# Patient Record
Sex: MALE | Race: NATIVE HAWAIIAN OR OTHER PACIFIC ISLANDER
[De-identification: names, ages, dates, MRNs, and addresses within clinical notes are randomized per-mention and may not be internally consistent; named-entity substitution may affect disease eponyms.]

---
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
## 2018-03-18 ENCOUNTER — HOSPITAL ENCOUNTER (EMERGENCY)
Dept: HOSPITAL 67 - ED | Age: 18
Discharge: HOME | End: 2018-03-18
Payer: COMMERCIAL

## 2018-03-18 VITALS — BODY MASS INDEX: 37.3 KG/M2 | WEIGHT: 300.01 LBS | HEIGHT: 75 IN

## 2018-03-18 VITALS — DIASTOLIC BLOOD PRESSURE: 89 MMHG | SYSTOLIC BLOOD PRESSURE: 136 MMHG

## 2018-03-18 VITALS — TEMPERATURE: 97.3 F

## 2018-03-18 DIAGNOSIS — Z13.89: Primary | ICD-10-CM

## 2018-03-18 LAB
APTT BLD: 30.6 SECONDS (ref 24.5–33.6)
PLATELET # BLD: 246 K/UL (ref 142–355)
POTASSIUM SERPL-SCNC: 3.4 MMOL/L (ref 3.6–5.2)
SODIUM SERPL-SCNC: 133 MMOL/L (ref 136–145)

## 2018-03-18 PROCEDURE — 96365 THER/PROPH/DIAG IV INF INIT: CPT

## 2018-03-18 PROCEDURE — 96372 THER/PROPH/DIAG INJ SC/IM: CPT

## 2018-03-18 PROCEDURE — 80053 COMPREHEN METABOLIC PANEL: CPT

## 2018-03-18 PROCEDURE — 74022 RADEX COMPL AQT ABD SERIES: CPT

## 2018-03-18 PROCEDURE — 85610 PROTHROMBIN TIME: CPT

## 2018-03-18 PROCEDURE — 85027 COMPLETE CBC AUTOMATED: CPT

## 2018-03-18 PROCEDURE — 85730 THROMBOPLASTIN TIME PARTIAL: CPT

## 2018-03-18 PROCEDURE — 81000 URINALYSIS NONAUTO W/SCOPE: CPT

## 2018-03-18 PROCEDURE — 99284 EMERGENCY DEPT VISIT MOD MDM: CPT

## 2018-03-18 PROCEDURE — 36415 COLL VENOUS BLD VENIPUNCTURE: CPT

## 2019-07-15 ENCOUNTER — HOSPITAL ENCOUNTER (EMERGENCY)
Dept: HOSPITAL 67 - ED | Age: 19
Discharge: HOME | End: 2019-07-15
Payer: COMMERCIAL

## 2019-07-15 VITALS — HEIGHT: 72 IN | BODY MASS INDEX: 41.99 KG/M2 | WEIGHT: 310.01 LBS

## 2019-07-15 VITALS — SYSTOLIC BLOOD PRESSURE: 119 MMHG | TEMPERATURE: 100 F | DIASTOLIC BLOOD PRESSURE: 75 MMHG

## 2019-07-15 DIAGNOSIS — R55: Primary | ICD-10-CM
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
## 2019-07-15 DIAGNOSIS — E86.0: ICD-10-CM
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
## 2019-07-15 DIAGNOSIS — S09.8XXA: ICD-10-CM
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
## 2019-07-15 LAB
APTT BLD: 26.3 SECONDS (ref 24.5–33.6)
PLATELET # BLD: 233 K/UL (ref 142–355)
POTASSIUM SERPL-SCNC: 4.4 MMOL/L (ref 3.6–5.2)
SODIUM SERPL-SCNC: 139 MMOL/L (ref 136–145)

## 2019-07-15 PROCEDURE — 82550 ASSAY OF CK (CPK): CPT

## 2019-07-15 PROCEDURE — 80307 DRUG TEST PRSMV CHEM ANLYZR: CPT

## 2019-07-15 PROCEDURE — 96360 HYDRATION IV INFUSION INIT: CPT

## 2019-07-15 PROCEDURE — 81000 URINALYSIS NONAUTO W/SCOPE: CPT

## 2019-07-15 PROCEDURE — 36415 COLL VENOUS BLD VENIPUNCTURE: CPT

## 2019-07-15 PROCEDURE — 84443 ASSAY THYROID STIM HORMONE: CPT

## 2019-07-15 PROCEDURE — 85027 COMPLETE CBC AUTOMATED: CPT

## 2019-07-15 PROCEDURE — 85730 THROMBOPLASTIN TIME PARTIAL: CPT

## 2019-07-15 PROCEDURE — 96361 HYDRATE IV INFUSION ADD-ON: CPT

## 2019-07-15 PROCEDURE — 83735 ASSAY OF MAGNESIUM: CPT

## 2019-07-15 PROCEDURE — 82962 GLUCOSE BLOOD TEST: CPT

## 2019-07-15 PROCEDURE — 85610 PROTHROMBIN TIME: CPT

## 2019-07-15 PROCEDURE — 80053 COMPREHEN METABOLIC PANEL: CPT

## 2019-07-15 PROCEDURE — 84484 ASSAY OF TROPONIN QUANT: CPT

## 2019-07-15 PROCEDURE — 99284 EMERGENCY DEPT VISIT MOD MDM: CPT

## 2019-07-15 PROCEDURE — 93005 ELECTROCARDIOGRAM TRACING: CPT

## 2019-07-27 ENCOUNTER — HOSPITAL ENCOUNTER (EMERGENCY)
Dept: HOSPITAL 67 - ED | Age: 19
Discharge: HOME | End: 2019-07-27
Payer: COMMERCIAL

## 2019-07-27 VITALS — DIASTOLIC BLOOD PRESSURE: 63 MMHG | TEMPERATURE: 98.3 F | SYSTOLIC BLOOD PRESSURE: 119 MMHG

## 2019-07-27 VITALS — BODY MASS INDEX: 38.54 KG/M2 | WEIGHT: 310 LBS | HEIGHT: 75 IN

## 2019-07-27 DIAGNOSIS — L50.9: Primary | ICD-10-CM

## 2019-07-27 PROCEDURE — 99282 EMERGENCY DEPT VISIT SF MDM: CPT

## 2019-07-27 PROCEDURE — 99283 EMERGENCY DEPT VISIT LOW MDM: CPT

## 2019-07-27 PROCEDURE — 96372 THER/PROPH/DIAG INJ SC/IM: CPT

## 2019-08-24 ENCOUNTER — HOSPITAL ENCOUNTER (EMERGENCY)
Dept: HOSPITAL 67 - ED | Age: 19
Discharge: HOME | End: 2019-08-24
Payer: COMMERCIAL

## 2019-08-24 VITALS — BODY MASS INDEX: 39.17 KG/M2 | HEIGHT: 75 IN | WEIGHT: 315 LBS

## 2019-08-24 VITALS — SYSTOLIC BLOOD PRESSURE: 117 MMHG | TEMPERATURE: 98.1 F | DIASTOLIC BLOOD PRESSURE: 76 MMHG

## 2019-08-24 DIAGNOSIS — E86.0: ICD-10-CM

## 2019-08-24 DIAGNOSIS — K52.89: Primary | ICD-10-CM

## 2019-08-24 LAB
PLATELET # BLD: 229 K/UL (ref 142–355)
POTASSIUM SERPL-SCNC: 3.6 MMOL/L (ref 3.6–5.2)
SODIUM SERPL-SCNC: 144 MMOL/L (ref 136–145)

## 2019-08-24 PROCEDURE — 85027 COMPLETE CBC AUTOMATED: CPT

## 2019-08-24 PROCEDURE — 96374 THER/PROPH/DIAG INJ IV PUSH: CPT

## 2019-08-24 PROCEDURE — 96375 TX/PRO/DX INJ NEW DRUG ADDON: CPT

## 2019-08-24 PROCEDURE — 99284 EMERGENCY DEPT VISIT MOD MDM: CPT

## 2019-08-24 PROCEDURE — 96360 HYDRATION IV INFUSION INIT: CPT

## 2019-08-24 PROCEDURE — 80053 COMPREHEN METABOLIC PANEL: CPT

## 2019-08-24 PROCEDURE — 96365 THER/PROPH/DIAG IV INF INIT: CPT

## 2019-08-24 PROCEDURE — 36415 COLL VENOUS BLD VENIPUNCTURE: CPT

## 2019-08-27 ENCOUNTER — HOSPITAL ENCOUNTER (EMERGENCY)
Dept: HOSPITAL 67 - ED | Age: 19
Discharge: HOME | End: 2019-08-27
Payer: COMMERCIAL

## 2019-08-27 VITALS — TEMPERATURE: 98.9 F | SYSTOLIC BLOOD PRESSURE: 125 MMHG | DIASTOLIC BLOOD PRESSURE: 74 MMHG

## 2019-08-27 VITALS — HEIGHT: 75 IN | BODY MASS INDEX: 38.17 KG/M2 | WEIGHT: 307 LBS

## 2019-08-27 DIAGNOSIS — H60.8X1: Primary | ICD-10-CM

## 2019-08-27 PROCEDURE — 96372 THER/PROPH/DIAG INJ SC/IM: CPT

## 2019-08-27 PROCEDURE — 99283 EMERGENCY DEPT VISIT LOW MDM: CPT

## 2020-05-03 ENCOUNTER — HOSPITAL ENCOUNTER (OUTPATIENT)
Dept: HOSPITAL 67 - ED | Age: 20
Setting detail: OBSERVATION
LOS: 1 days | Discharge: HOME | End: 2020-05-04
Attending: INTERNAL MEDICINE | Admitting: INTERNAL MEDICINE
Payer: COMMERCIAL

## 2020-05-03 VITALS — WEIGHT: 310 LBS | HEIGHT: 75 IN | BODY MASS INDEX: 38.54 KG/M2 | BODY MASS INDEX: 38.54 KG/M2

## 2020-05-03 VITALS — TEMPERATURE: 101.1 F | SYSTOLIC BLOOD PRESSURE: 133 MMHG | DIASTOLIC BLOOD PRESSURE: 78 MMHG

## 2020-05-03 DIAGNOSIS — R50.9: ICD-10-CM

## 2020-05-03 DIAGNOSIS — Z03.818: ICD-10-CM

## 2020-05-03 DIAGNOSIS — I10: ICD-10-CM

## 2020-05-03 DIAGNOSIS — R73.9: ICD-10-CM

## 2020-05-03 DIAGNOSIS — J06.9: ICD-10-CM

## 2020-05-03 DIAGNOSIS — R00.2: ICD-10-CM

## 2020-05-03 DIAGNOSIS — R09.81: ICD-10-CM

## 2020-05-03 DIAGNOSIS — R55: Primary | ICD-10-CM

## 2020-05-03 LAB
PLATELET # BLD: 207 K/UL (ref 142–355)
POTASSIUM SERPL-SCNC: 3.8 MMOL/L (ref 3.6–5.2)
SODIUM SERPL-SCNC: 137 MMOL/L (ref 136–145)

## 2020-05-03 PROCEDURE — 85730 THROMBOPLASTIN TIME PARTIAL: CPT

## 2020-05-03 PROCEDURE — 96372 THER/PROPH/DIAG INJ SC/IM: CPT

## 2020-05-03 PROCEDURE — 87502 INFLUENZA DNA AMP PROBE: CPT

## 2020-05-03 PROCEDURE — 82550 ASSAY OF CK (CPK): CPT

## 2020-05-03 PROCEDURE — 85379 FIBRIN DEGRADATION QUANT: CPT

## 2020-05-03 PROCEDURE — 83036 HEMOGLOBIN GLYCOSYLATED A1C: CPT

## 2020-05-03 PROCEDURE — 85027 COMPLETE CBC AUTOMATED: CPT

## 2020-05-03 PROCEDURE — U0002 COVID-19 LAB TEST NON-CDC: HCPCS

## 2020-05-03 PROCEDURE — 87651 STREP A DNA AMP PROBE: CPT

## 2020-05-03 PROCEDURE — 99284 EMERGENCY DEPT VISIT MOD MDM: CPT

## 2020-05-03 PROCEDURE — 83880 ASSAY OF NATRIURETIC PEPTIDE: CPT

## 2020-05-03 PROCEDURE — G0378 HOSPITAL OBSERVATION PER HR: HCPCS

## 2020-05-03 PROCEDURE — 99220: CPT

## 2020-05-03 PROCEDURE — 96366 THER/PROPH/DIAG IV INF ADDON: CPT

## 2020-05-03 PROCEDURE — 85610 PROTHROMBIN TIME: CPT

## 2020-05-03 PROCEDURE — 93005 ELECTROCARDIOGRAM TRACING: CPT

## 2020-05-03 PROCEDURE — 96376 TX/PRO/DX INJ SAME DRUG ADON: CPT

## 2020-05-03 PROCEDURE — 96360 HYDRATION IV INFUSION INIT: CPT

## 2020-05-03 PROCEDURE — 84484 ASSAY OF TROPONIN QUANT: CPT

## 2020-05-03 PROCEDURE — 80053 COMPREHEN METABOLIC PANEL: CPT

## 2020-05-03 PROCEDURE — 36415 COLL VENOUS BLD VENIPUNCTURE: CPT

## 2020-05-03 PROCEDURE — 81000 URINALYSIS NONAUTO W/SCOPE: CPT

## 2020-05-03 PROCEDURE — 94664 DEMO&/EVAL PT USE INHALER: CPT

## 2020-05-03 PROCEDURE — 96365 THER/PROPH/DIAG IV INF INIT: CPT

## 2020-05-03 PROCEDURE — 87040 BLOOD CULTURE FOR BACTERIA: CPT

## 2020-05-03 PROCEDURE — 87635 SARS-COV-2 COVID-19 AMP PRB: CPT

## 2020-05-03 PROCEDURE — 96375 TX/PRO/DX INJ NEW DRUG ADDON: CPT

## 2020-05-04 VITALS — TEMPERATURE: 98.6 F | DIASTOLIC BLOOD PRESSURE: 61 MMHG | SYSTOLIC BLOOD PRESSURE: 112 MMHG

## 2020-05-04 VITALS — DIASTOLIC BLOOD PRESSURE: 67 MMHG | SYSTOLIC BLOOD PRESSURE: 126 MMHG | TEMPERATURE: 98.4 F

## 2020-05-04 VITALS — DIASTOLIC BLOOD PRESSURE: 94 MMHG | TEMPERATURE: 98.9 F | SYSTOLIC BLOOD PRESSURE: 121 MMHG

## 2020-05-04 VITALS — TEMPERATURE: 98.1 F | SYSTOLIC BLOOD PRESSURE: 118 MMHG | DIASTOLIC BLOOD PRESSURE: 61 MMHG

## 2020-05-04 VITALS — DIASTOLIC BLOOD PRESSURE: 92 MMHG | TEMPERATURE: 99 F | SYSTOLIC BLOOD PRESSURE: 134 MMHG

## 2020-05-04 VITALS — TEMPERATURE: 98.9 F | DIASTOLIC BLOOD PRESSURE: 74 MMHG | SYSTOLIC BLOOD PRESSURE: 121 MMHG

## 2020-05-04 VITALS — SYSTOLIC BLOOD PRESSURE: 137 MMHG | DIASTOLIC BLOOD PRESSURE: 86 MMHG | TEMPERATURE: 97.7 F

## 2020-05-04 LAB — APTT BLD: 30.8 SECONDS (ref 24.5–33.6)

## 2020-05-04 NOTE — NUR
5/4/2020 0140 PT TO ROOM 1118 DX SYNCOPE,FEVER.PT TO ROOM VIA W/C ORIENTED TO
ROOM CALL LIGHT WITHIN REACH PER ER NURSE ALISON CAPUTO.SALINE LOCK TO LEFT AC
20 GAUDGE INTACT.PT TESTED FOR COVID 19 IN ER.CC

## 2020-05-04 NOTE — NUR
FULL PPE APPLIED BEFORE ENTERING ROOM. AM ASSESSMENT COMPLETE. PT INSTRUCTED
TO CALL WITH ANY NEEDS. PT GIVEN INSTRUCTIONS ON HOW TO USE PHONE FOR OUTSIDE
LINE.

## 2020-05-04 NOTE — NUR
BREAKFAST TRAY TAKEN BACK INTO ROOM. PT AWAKE AT THIS TIME. PT AGAIN
INSTRUCTED TO CALL WITH ANY NEEDS

## 2020-05-04 NOTE — NUR
IV to left AC removed with bandaid applied. Discharge instructions given to
patient he verbalized understanding. He left his room with mask inplace exited
through side door.

## 2020-11-24 ENCOUNTER — HOSPITAL ENCOUNTER (EMERGENCY)
Dept: HOSPITAL 67 - ED | Age: 20
Discharge: HOME | End: 2020-11-24
Payer: COMMERCIAL

## 2020-11-24 VITALS — SYSTOLIC BLOOD PRESSURE: 96 MMHG | DIASTOLIC BLOOD PRESSURE: 65 MMHG | TEMPERATURE: 98.9 F

## 2020-11-24 VITALS — HEIGHT: 75 IN | WEIGHT: 310.01 LBS | BODY MASS INDEX: 38.55 KG/M2

## 2020-11-24 DIAGNOSIS — R51.9: Primary | ICD-10-CM

## 2020-11-24 DIAGNOSIS — R50.9: ICD-10-CM

## 2020-11-24 DIAGNOSIS — F17.220: ICD-10-CM

## 2020-11-24 DIAGNOSIS — Z03.818: ICD-10-CM

## 2020-11-24 DIAGNOSIS — J06.9: ICD-10-CM

## 2020-11-24 PROCEDURE — U0003 INFECTIOUS AGENT DETECTION BY NUCLEIC ACID (DNA OR RNA); SEVERE ACUTE RESPIRATORY SYNDROME CORONAVIRUS 2 (SARS-COV-2) (CORONAVIRUS DISEASE [COVID-19]), AMPLIFIED PROBE TECHNIQUE, MAKING USE OF HIGH THROUGHPUT TECHNOLOGIES AS DESCRIBED BY CMS-2020-01-R: HCPCS

## 2020-11-24 PROCEDURE — 87502 INFLUENZA DNA AMP PROBE: CPT

## 2020-11-24 PROCEDURE — 87651 STREP A DNA AMP PROBE: CPT

## 2020-11-24 PROCEDURE — 87635 SARS-COV-2 COVID-19 AMP PRB: CPT

## 2020-11-24 PROCEDURE — 99283 EMERGENCY DEPT VISIT LOW MDM: CPT

## 2021-05-19 ENCOUNTER — HOSPITAL ENCOUNTER (EMERGENCY)
Dept: HOSPITAL 67 - ED | Age: 21
Discharge: HOME | End: 2021-05-19
Payer: COMMERCIAL

## 2021-05-19 VITALS — DIASTOLIC BLOOD PRESSURE: 75 MMHG | SYSTOLIC BLOOD PRESSURE: 116 MMHG

## 2021-05-19 VITALS — BODY MASS INDEX: 37.3 KG/M2 | HEIGHT: 75 IN | WEIGHT: 300.01 LBS

## 2021-05-19 VITALS — TEMPERATURE: 98 F

## 2021-05-19 DIAGNOSIS — K21.9: Primary | ICD-10-CM

## 2021-05-19 LAB
PLATELET # BLD: 191 K/UL (ref 142–355)
POTASSIUM SERPL-SCNC: 3.8 MMOL/L (ref 3.6–5.2)
SODIUM SERPL-SCNC: 139 MMOL/L (ref 136–145)

## 2021-05-19 PROCEDURE — 82550 ASSAY OF CK (CPK): CPT

## 2021-05-19 PROCEDURE — 93005 ELECTROCARDIOGRAM TRACING: CPT

## 2021-05-19 PROCEDURE — 99282 EMERGENCY DEPT VISIT SF MDM: CPT

## 2021-05-19 PROCEDURE — 84484 ASSAY OF TROPONIN QUANT: CPT

## 2021-05-19 PROCEDURE — 85027 COMPLETE CBC AUTOMATED: CPT

## 2021-05-19 PROCEDURE — 99283 EMERGENCY DEPT VISIT LOW MDM: CPT

## 2021-05-19 PROCEDURE — 80053 COMPREHEN METABOLIC PANEL: CPT

## 2021-06-28 ENCOUNTER — HOSPITAL ENCOUNTER (EMERGENCY)
Dept: HOSPITAL 67 - ED | Age: 21
Discharge: HOME | End: 2021-06-28
Payer: COMMERCIAL

## 2021-06-28 DIAGNOSIS — H65.191: ICD-10-CM

## 2021-06-28 DIAGNOSIS — R09.1: Primary | ICD-10-CM

## 2021-06-28 PROCEDURE — 96374 THER/PROPH/DIAG INJ IV PUSH: CPT

## 2021-06-28 PROCEDURE — 85379 FIBRIN DEGRADATION QUANT: CPT

## 2021-06-28 PROCEDURE — 99284 EMERGENCY DEPT VISIT MOD MDM: CPT

## 2021-06-28 PROCEDURE — 36415 COLL VENOUS BLD VENIPUNCTURE: CPT

## 2021-06-28 PROCEDURE — 85027 COMPLETE CBC AUTOMATED: CPT

## 2021-06-28 PROCEDURE — 84484 ASSAY OF TROPONIN QUANT: CPT

## 2021-06-28 PROCEDURE — 80048 BASIC METABOLIC PNL TOTAL CA: CPT

## 2021-06-28 PROCEDURE — 93005 ELECTROCARDIOGRAM TRACING: CPT

## 2021-07-06 LAB
PLATELET # BLD: 189 K/UL (ref 142–355)
POTASSIUM SERPL-SCNC: 3.8 MMOL/L (ref 3.6–5.2)
SODIUM SERPL-SCNC: 143 MMOL/L (ref 136–145)